# Patient Record
Sex: MALE | Race: ASIAN
[De-identification: names, ages, dates, MRNs, and addresses within clinical notes are randomized per-mention and may not be internally consistent; named-entity substitution may affect disease eponyms.]

---

## 2019-09-12 ENCOUNTER — HOSPITAL (OUTPATIENT)
Dept: OTHER | Age: 29
End: 2019-09-12

## 2019-11-12 ENCOUNTER — HOSPITAL ENCOUNTER (EMERGENCY)
Facility: HOSPITAL | Age: 29
Discharge: HOME | End: 2019-11-12
Attending: EMERGENCY MEDICINE
Payer: COMMERCIAL

## 2019-11-12 ENCOUNTER — APPOINTMENT (OUTPATIENT)
Dept: CT IMAGING | Facility: HOSPITAL | Age: 29
End: 2019-11-12
Attending: EMERGENCY MEDICINE
Payer: COMMERCIAL

## 2019-11-12 VITALS
SYSTOLIC BLOOD PRESSURE: 143 MMHG | HEART RATE: 98 BPM | TEMPERATURE: 98 F | DIASTOLIC BLOOD PRESSURE: 122 MMHG | RESPIRATION RATE: 19 BRPM | OXYGEN SATURATION: 99 %

## 2019-11-12 DIAGNOSIS — S22.22XA CLOSED FRACTURE OF BODY OF STERNUM, INITIAL ENCOUNTER: Primary | ICD-10-CM

## 2019-11-12 PROCEDURE — 71260 CT THORAX DX C+: CPT | Performed by: EMERGENCY MEDICINE

## 2019-11-12 PROCEDURE — 83690 ASSAY OF LIPASE: CPT | Performed by: EMERGENCY MEDICINE

## 2019-11-12 PROCEDURE — 96375 TX/PRO/DX INJ NEW DRUG ADDON: CPT

## 2019-11-12 PROCEDURE — 85025 COMPLETE CBC W/AUTO DIFF WBC: CPT | Performed by: EMERGENCY MEDICINE

## 2019-11-12 PROCEDURE — 74177 CT ABD & PELVIS W/CONTRAST: CPT | Performed by: EMERGENCY MEDICINE

## 2019-11-12 PROCEDURE — 96374 THER/PROPH/DIAG INJ IV PUSH: CPT

## 2019-11-12 PROCEDURE — 70450 CT HEAD/BRAIN W/O DYE: CPT | Performed by: EMERGENCY MEDICINE

## 2019-11-12 PROCEDURE — 93005 ELECTROCARDIOGRAM TRACING: CPT

## 2019-11-12 PROCEDURE — 80076 HEPATIC FUNCTION PANEL: CPT | Performed by: EMERGENCY MEDICINE

## 2019-11-12 PROCEDURE — 99285 EMERGENCY DEPT VISIT HI MDM: CPT

## 2019-11-12 PROCEDURE — 93010 ELECTROCARDIOGRAM REPORT: CPT | Performed by: EMERGENCY MEDICINE

## 2019-11-12 PROCEDURE — 21820 TREAT STERNUM FRACTURE: CPT

## 2019-11-12 PROCEDURE — 81003 URINALYSIS AUTO W/O SCOPE: CPT | Performed by: EMERGENCY MEDICINE

## 2019-11-12 PROCEDURE — 80048 BASIC METABOLIC PNL TOTAL CA: CPT | Performed by: EMERGENCY MEDICINE

## 2019-11-12 PROCEDURE — 80307 DRUG TEST PRSMV CHEM ANLYZR: CPT | Performed by: EMERGENCY MEDICINE

## 2019-11-12 PROCEDURE — 80320 DRUG SCREEN QUANTALCOHOLS: CPT | Performed by: EMERGENCY MEDICINE

## 2019-11-12 RX ORDER — MORPHINE SULFATE 4 MG/ML
4 INJECTION, SOLUTION INTRAMUSCULAR; INTRAVENOUS ONCE
Status: COMPLETED | OUTPATIENT
Start: 2019-11-12 | End: 2019-11-12

## 2019-11-12 RX ORDER — HYDROCODONE BITARTRATE AND ACETAMINOPHEN 5; 325 MG/1; MG/1
1-2 TABLET ORAL EVERY 4 HOURS PRN
Qty: 15 TABLET | Refills: 0 | Status: SHIPPED | OUTPATIENT
Start: 2019-11-12

## 2019-11-12 RX ORDER — KETOROLAC TROMETHAMINE 30 MG/ML
30 INJECTION, SOLUTION INTRAMUSCULAR; INTRAVENOUS ONCE
Status: COMPLETED | OUTPATIENT
Start: 2019-11-12 | End: 2019-11-12

## 2019-11-12 NOTE — ED NOTES
Pt resting on cart. RR even/nonlabored. C/o pain and inability to take deep breaths. O2 97% on room air.

## 2019-11-12 NOTE — ED NOTES
Patient asked multiple times to put phone and earphones into his briefcase on his own. When given his briefcase, patient put his belongings inside and locked the briefcase with a small travel lock.
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
## 2019-11-12 NOTE — ED NOTES
Pt education completed for incentive spirometer. Pt demonstrated proper use of device. All questions answered.
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
## 2025-03-16 NOTE — DISCHARGE INSTRUCTIONS
Thank you for seeking care at [facility] Emergency Department.  You have been seen and evaluated and treated for a peritonsillar abscess.     We discussed the results of your evaluation and work-up in the emergency department   Please read the instructions provided   If given prescriptions, take as instructed   Please keep the area clean, and wash the area gently with mild soap and warm water twice daily.    Not all abscesses require antibiotics, but if antibiotics were prescribed, please complete the course of treatment as directed.    STOP taking the Cefdinir and take the Augmentin instead for 14 days.    Remember, your care process does not end after your visit today. Please follow-up with your doctor within 1-2 days for a follow-up check to ensure your symptoms are improving, to see if you need any further evaluation/testing, or to evaluate for any alternate diagnoses.     If you notice increasing pain, increasing redness around the wound, increasing drainage, feeling lightheaded or dizzy, heavy bleeding, begin having fevers or chills, or feeling very ill you should contact your doctor or return to the emergency department.

## 2025-03-16 NOTE — ED INITIAL ASSESSMENT (HPI)
Pt presents to the ED with c/o worsening left sided throat swelling since 3/13. Pt originally diagnosed with strep and left ear infection on 3/13 and placed of Cefdinir. Pt was seen at  today and diagnosed with a left sided tonsillar abscess. +fevers

## 2025-03-16 NOTE — ED PROVIDER NOTES
[facility] Emergency Department Note  Patient: Bong Membreno Age: 34 year old Sex: male      MRN: [id number]  : 1990    Patient Seen in: [facility] Emergency Department    History     Chief Complaint   Patient presents with    Swelling     Stated Complaint: Abcess, difficulty swallowing    History obtained from: Patient    Patient is a 34-year-old male with past medical history of ADHD presenting today for evaluation of sore throat.  He states that has been having sore throat and left ear pain over the past 5 days.  He states that on 3/13/2025, he was seen in the immediate care facility and diagnosed with strep pharyngitis and a left-sided ear infection and started on cefdinir.  He states that since then, he has been having progressively worsening throat pain and soft tissue swelling now seemingly localized in the left side of his throat.  He states that he was seen again in the immediate care yesterday and given a dose of Decadron with transient improvement of swelling.  States that swelling is worse to the point he is feels short of breath lying flat.  Endorses fevers only at night.  Denies any vomiting, skin rash, or cough.    Review of Systems:  Review of Systems  Positive for stated complaint: Abcess, difficulty swallowing. Constitutional and vital signs reviewed. All other systems reviewed and negative except as noted above.    Patient History:  Past Medical History:    ADHD       No past surgical history on file.     No family history on file.    Specific Social Determinants of Health:   Social History     Socioeconomic History    Marital status: Single           PSFH elements reviewed from today and agreed except as otherwise stated in HPI.    Physical Exam     ED Triage Vitals   BP 25 1335 (!) 155/93   Pulse 25 1335 (!) 131   Resp 25 1335 18   Temp 25 1335 99.2 °F (37.3 °C)   Temp src 25 1335 Oral   SpO2 25 1415 98 %   O2 Device 25 1415 None (Room air)        Current:BP (!) 141/99   Pulse 111   Temp 99.2 °F (37.3 °C) (Oral)   Resp 16   Ht 172.7 cm (5' 8\")   Wt 73 kg   SpO2 99%   BMI 24.48 kg/m²         Physical Exam  Constitutional:       Appearance: He is well-developed.   HENT:      Head: Normocephalic and atraumatic.      Right Ear: External ear normal.      Left Ear: External ear normal.      Nose: Nose normal.      Mouth/Throat:      Comments: Left-sided peritonsillar fullness with left-sided exudates, rightward deviation of the uvula, no right-sided swelling  Eyes:      Conjunctiva/sclera: Conjunctivae normal.      Pupils: Pupils are equal, round, and reactive to light.   Cardiovascular:      Rate and Rhythm: Normal rate and regular rhythm.      Heart sounds: Normal heart sounds.   Pulmonary:      Effort: Pulmonary effort is normal.      Breath sounds: Normal breath sounds.   Abdominal:      General: Bowel sounds are normal.      Palpations: Abdomen is soft.      Tenderness: There is no abdominal tenderness.   Musculoskeletal:         General: Normal range of motion.      Cervical back: Normal range of motion and neck supple.   Skin:     General: Skin is warm and dry.      Findings: No rash.   Neurological:      General: No focal deficit present.      Mental Status: He is alert and oriented to person, place, and time.      Deep Tendon Reflexes: Reflexes are normal and symmetric.   Psychiatric:         Mood and Affect: Mood normal.         Behavior: Behavior normal.         ED Course   Labs:   Labs Reviewed   BASIC METABOLIC PANEL (8) - Abnormal; Notable for the following components:       Result Value    Potassium 3.4 (*)     BUN 8 (*)     All other components within normal limits   CBC WITH DIFFERENTIAL WITH PLATELET - Abnormal; Notable for the following components:    Neutrophil Absolute Prelim 8.86 (*)     Neutrophil Absolute 8.86 (*)     All other components within normal limits     Radiology findings:  I personally reviewed the images.   CT SOFT  TISSUE OF NECK(CONTRAST ONLY) (CPT=70491)    Result Date: 3/16/2025  CONCLUSION:  1. Massive enlargement of left palatine tonsil with a 2.9 cm tonsillar abscess.  ENT evaluation recommended.    Dictated by (CST): Duc Pennington MD on 3/16/2025 at 3:47 PM     Finalized by (CST): Duc Pennington MD on 3/16/2025 at 3:53 PM             Cardiac Monitor: Interpreted by me.   Pulse Readings from Last 1 Encounters:   03/16/25 111   , sinus,     External non-ED records reviewed independently by me: PCP note from 9/18/2024 reviewed confirming patient has past medical history of chronic neck pain, facial pain with TMJ dislocation and PTSD.    MDM   34-year-old male with past medical history of ADHD presenting for evaluation of sore throat gradually worsening over the past 4 days despite being on cefdinir initiated by the immediate care facility.  Upon arrival to the emergency department, he is tachycardic but otherwise afebrile.  Has left-sided peritonsillar fullness and rightward deviation of the uvula with left-sided exudates and erythema.    Differential diagnoses considered includes, but is not limited to: Peritonsillar abscess, phlegmon, retropharyngeal abscess, strep pharyngitis    Will obtain the following tests: CBC, BMP, CT soft tissue neck  Please see ED course for my independent review of these tests/imaging results.    Initial Medications/Therapeutics administered: Toradol, Decadron, NS bolus, IV Unasyn,    Chronic conditions affecting care: ADHD, PTSD, chronic neck pain    Workup and medications considered but not ordered: Considered admission    Social Determinants of Health that impacted care: None     ED course: Laboratory workup with no significant leukocytosis.  I independently reviewed the CT soft tissue neck that shows evidence of peritonsillar abscess.  Agree with radiology reads above.  I performed a bedside needle aspiration of the peritonsillar abscess and drained 4 cc of purulent drainage.  Patient  tolerated this well.  Noted significant for admitted pain.  Stable for discharge home at this time on course of Augmentin with close ENT follow-up.  Discussed Tylenol and ibuprofen as needed for pain.  Return precautions provided and all questions answered.  Patient expressed understanding and agreement with plan.        Procedures:  Procedure: Peritonsilar abscess drainage.  Prior to procedure, documentation was reviewed, informed consent was obtained, appropriate equipment was present, and a time out was performed to identify the correct patient, procedure and site .     Topical lidocaine applied followed by injection of 2 cc of lido 1% w/ epi injected then aspirated 4 cc of thick purulent drainage.  Patient tolerated well min blood loss.          Disposition and Plan     Clinical Impression:  1. Peritonsillar abscess        Disposition:  Discharge    Follow-up:  Finesse Beltran MD  [facility] [address]  [phone]    Schedule an appointment as soon as possible for a visit in 2 day(s)  As needed, If symptoms worsen      Medications Prescribed:  Discharge Medication List as of 3/16/2025  5:24 PM        START taking these medications    Details   amoxicillin clavulanate 875-125 MG Oral Tab Take 1 tablet by mouth 2 (two) times daily for 14 days., Normal, Disp-28 tablet, R-0               This note may have been created using voice dictation technology and may include inadvertent errors.      Teresa Arevalo MD  Emergency Medicine

## 2025-03-20 NOTE — CM/SW NOTE
Per LAZARO, called [facility] ENT at [phone] and spoke with Grecia to schedule an expedited appointment for patient.    Appointment scheduled for:    Friday, March 21, 2025 at 230pm  Dr. Anshul Morris  [address] [place] [address]    EDCM spoke with patient and he v/u on above information.    MD and Rn updated.

## 2025-03-20 NOTE — ED INITIAL ASSESSMENT (HPI)
Pt states that he was seen here 03/16/2025 Diagnosed with Peritonsillar Abscess currently taking antibiotics.  Per pt morning of March 19,2025 pain on swallowing,troubled swallowing started.  Denies fever.  Took Ibuprofen 600 mgs this morning.  Pt is A/Ox 4, breathing unlabored.

## 2025-03-20 NOTE — ED PROVIDER NOTES
Patient Seen in: [facility] Emergency Department      History     Chief Complaint   Patient presents with    Swallowing Problem     Stated Complaint: trouble with swallowing,peritonsillar abscess    Subjective:   HPI      34-year-old seen a few days ago with a diagnosed left peritonsillar abscess status post aspiration on Augmentin given 1 dose of Decadron that they now here with some worsening pain without ibuprofen helping at home.  No fever.  Still having swallowing pain.  Has not seen ENT.  He reports good compliance with his medications specifically his antibiotics.    Objective:     Past Medical History:    ADHD              History reviewed. No pertinent surgical history.             No pertinent social history.                Physical Exam     ED Triage Vitals [03/20/25 1417]   BP (!) 139/99   Pulse 113   Resp 20   Temp 98.4 °F (36.9 °C)   Temp src Oral   SpO2 98 %   O2 Device None (Room air)       Current Vitals:   Vital Signs  BP: (!) 145/111  Pulse: 100  Resp: 20  Temp: 98.4 °F (36.9 °C)  Temp src: Oral  MAP (mmHg): (!) 122    Oxygen Therapy  SpO2: 99 %  O2 Device: None (Room air)        Physical Exam  Constitutional: Oriented to person, place, and time.  Appears well-developed. No distress.   Head: Normocephalic and atraumatic.   Eyes: Conjunctivae are normal. Pupils are equal, round, and reactive to light.    ENT: Fullness across the posterior pharyngeal regions bilaterally worse on the left than the right.  No obvious protruding abscess.  Uvula is slightly edematous.  Can visualize the epiglottis which appears normal.  He is controlling his secretions.  There is no trismus.  Neck: Normal range of motion. Neck supple.  Mild anterior lymphadenopathy bilaterally.  No stridor or fullness abnormality.  Cardiovascular: Normal rate, regular rhythm and intact distal pulses.    Pulmonary/Chest: Effort normal. No respiratory distress.   Musculoskeletal: Normal range of motion. No edema or tenderness.    Neurological: Alert and oriented to person, place, and time.   Skin: Skin is warm and dry.     Nursing note and vitals reviewed.    Differential diagnosis includes resolving peritonsillar abscess and tonsillar cellulitis.      ED Course   Labs Reviewed - No data to display                MDM              Medical Decision Making  Patient is very anxious but looks great.  I talked to the , she will try to get him into see ENT tomorrow if possible.  Will start him on some steroids and a taper now.  He will continue the Augmentin.  I will give him something else for pain since ibuprofen is not helping.  Encourage fluids.  He can do OTC Tylenol.  He will follow-up as planned and come back with any worsening or change.  No indication for admission to the hospital at this time.    Problems Addressed:  Peritonsillar abscess: acute illness or injury with systemic symptoms    Amount and/or Complexity of Data Reviewed  External Data Reviewed: radiology and notes.     Details: CT and notes reviewed from outpatient visit 3/16/2025 and patient was documented to have a left-sided peritonsillar abscess now status post aspiration at the bedside.  He is on Augmentin which is appropriate.  He got 1 dose of Decadron that day.    Risk  OTC drugs.  Prescription drug management.  Decision regarding hospitalization.        Disposition and Plan     Clinical Impression:  1. Peritonsillar abscess         Disposition:  Discharge  3/20/2025  4:10 pm    Follow-up:  Anshul Morris DO  [address] [address]  [phone]    Follow up in 1 day(s)            Medications Prescribed:  Current Discharge Medication List        START taking these medications    Details   predniSONE 20 MG Oral Tab Take 3 tablets (60 mg total) by mouth daily for 1 day, THEN 2 tablets (40 mg total) daily for 2 days, THEN 1 tablet (20 mg total) daily for 2 days.  Qty: 9 tablet, Refills: 0    Associated Diagnoses: Peritonsillar  abscess      !! HYDROcodone-acetaminophen 5-325 MG Oral Tab Take 1 tablet by mouth every 6 (six) hours as needed.  Qty: 10 tablet, Refills: 0    Associated Diagnoses: Peritonsillar abscess       !! - Potential duplicate medications found. Please discuss with provider.              Supplementary Documentation:

## 2025-03-20 NOTE — ED QUICK NOTES
Pt verbalizes understanding of discharge paperwork including medications, follow-up care, and education. Pt a/o x4, VSS, NAD, ambulates with steady gait. Pt verbalizes understanding of attending appointment with ENT tomorrow @ 0184.

## 2025-03-31 NOTE — ED INITIAL ASSESSMENT (HPI)
Pt to ed sent from ENT office. Pt states \"I have been seeing a ear nose and throat doctor and I was being treated for a throat infection with amoxicillin, the infection was going away, and the ENT told me the infection would go away after 14 days of treatment but it has not gotten better I actually think it spread down to the base of my tongue\" pt states he has a follow up appointment on 4/2 with ENT and is here requesting amoxicillin prescription until he see's ENT. Pt reports difficulty swallowing but not breathing.

## 2025-03-31 NOTE — PROGRESS NOTES
Subjective:   Patient ID: Bong Membreno is a 34 year old male.    Patient presents for throat pain and swelling feeling to his throat. Reports difficulty swallowing. Reports 4/10 pain at this time. Reports he has noticed 50% improvement but he is still feeling very sore.     3/16 ED visit LPTA drained and started on 14 day course of Augmentin.   3/20 ED visit Prednisone and Hydrocodone  3/21 ENT visit. Patient was improving and advised to continue Augmentin and F/U with ENT if symptoms return.   3/25 Patient reports visit at [facility]. He was prescribed additional taper course of Prednisone which he reports has not helped with symptoms.     He presents today for refill on Augmentin until he can get in with ENT.           History/Other:   Review of Systems   Constitutional:  Negative for chills and fever.   HENT:  Positive for sore throat and trouble swallowing. Negative for dental problem, drooling, ear pain and voice change.    Respiratory:  Negative for cough, chest tightness, shortness of breath, wheezing and stridor.    Cardiovascular:  Negative for chest pain.   Gastrointestinal:  Negative for nausea and vomiting.   Skin: Negative.    Psychiatric/Behavioral:  The patient is nervous/anxious.      Current Outpatient Medications   Medication Sig Dispense Refill    predniSONE 20 MG Oral Tab       ALPRAZolam ER 2 MG Oral Tablet 24 Hr Take 1 tablet (2 mg total) by mouth daily.      Omeprazole 40 MG Oral Capsule Delayed Release       Acetaminophen Extra Strength 500 MG Oral Tab Take 2 tablets (1,000 mg total) by mouth every 8 (eight) hours. (Patient not taking: Reported on 3/31/2025)      albuterol 108 (90 Base) MCG/ACT Inhalation Aero Soln Inhale 2 puffs into the lungs. (Patient not taking: Reported on 3/31/2025)      amphetamine-dextroamphetamine 20 MG Oral Tab Take 1 tablet (20 mg total) by mouth daily. (Patient not taking: Reported on 3/31/2025)      amphetamine-dextroamphetamine 30 MG Oral Tab Take 1  tablet (30 mg total) by mouth every morning. (Patient not taking: Reported on 3/31/2025)      amphetamine-dextroamphetamine ER 30 MG Oral Capsule SR 24 Hr TAKE 1 CAPSULE BY MOUTH (30 MG TOTAL) ONE TIME EACH DAY IN THE MORNING DO NOT CRUSH OR CHEW. (Patient not taking: Reported on 3/31/2025)      baclofen 10 MG Oral Tab Take 1 tablet (10 mg total) by mouth nightly. (Patient not taking: Reported on 3/31/2025)      busPIRone 15 MG Oral Tab TAKE 1 TABLET BY MOUTH IN THE MORNING AND 1 TABLET AT NOON AND 1 TABLET BEFORE BEDTIME (Patient not taking: Reported on 3/31/2025)      cefdinir 300 MG Oral Cap Take 1 capsule (300 mg total) by mouth 2 (two) times daily. (Patient not taking: Reported on 3/31/2025)      diclofenac 1 % External Gel Apply 1 Application topically daily. (Patient not taking: Reported on 3/31/2025)      FLUoxetine 20 MG Oral Cap Take 1 capsule (20 mg total) by mouth daily. (Patient not taking: Reported on 3/31/2025)      FLUoxetine HCl 40 MG Oral Cap Take 1 capsule (40 mg total) by mouth daily. (Patient not taking: Reported on 3/31/2025)      gabapentin 300 MG Oral Cap  (Patient not taking: Reported on 3/31/2025)      hydrOXYzine 25 MG Oral Tab Take 1 tablet (25 mg total) by mouth. (Patient not taking: Reported on 3/31/2025)      lidocaine 5 % External Ointment APPLY TOPICALLY TO THE AFFECTED AREA 2 TIMES A DAY IF NEEDED FOR MILD OR MODERATE PAIN (Patient not taking: Reported on 3/31/2025)      Lidocaine Viscous HCl 2 % Mouth/Throat Solution  (Patient not taking: Reported on 3/31/2025)      naproxen 500 MG Oral Tab Take 1 tablet (500 mg total) by mouth 2 (two) times daily with meals. (Patient not taking: Reported on 3/31/2025)      prazosin 2 MG Oral Cap  (Patient not taking: Reported on 3/31/2025)      prazosin 5 MG Oral Cap TAKE 1 CAPSULE BY MOUTH IN THE MORNIGN AND 1 CAPSULE BEFORE BEDTIME (Patient not taking: Reported on 3/31/2025)      pregabalin 75 MG Oral Cap  (Patient not taking: Reported on  3/31/2025)      QUEtiapine 300 MG Oral Tab Take 1 tablet (300 mg total) by mouth nightly. (Patient not taking: Reported on 3/31/2025)      QUEtiapine 100 MG Oral Tab TAKE 1.5 TABLET BY MOUTH (150MG TOTAL) EVERY NIGHT (Patient not taking: Reported on 3/31/2025)      QUEtiapine  MG Oral Tablet 24 Hr Take 1 tablet (300 mg total) by mouth nightly. (Patient not taking: Reported on 3/31/2025)      Urea 20 % External Cream Apply topically 2 (two) times daily. (Patient not taking: Reported on 3/31/2025)      HYDROcodone-acetaminophen 5-325 MG Oral Tab Take 1 tablet by mouth every 6 (six) hours as needed. (Patient not taking: Reported on 3/31/2025) 10 tablet 0    HYDROcodone-acetaminophen 5-325 MG Oral Tab Take 1-2 tablets by mouth every 4 (four) hours as needed for Pain. (Patient not taking: Reported on 3/31/2025) 15 tablet 0     Allergies:Allergies[1]    Objective:   Physical Exam  Constitutional:       General: He is not in acute distress.     Appearance: Normal appearance. He is not ill-appearing.   HENT:      Head:      Jaw: No trismus.      Right Ear: Tympanic membrane normal.      Left Ear: Tympanic membrane normal.      Nose: Nose normal.      Mouth/Throat:      Mouth: Mucous membranes are moist.      Pharynx: Uvula midline. Posterior oropharyngeal erythema present. No uvula swelling.      Tonsils: No tonsillar exudate or tonsillar abscesses.      Comments: Uvula with bruising. Epiglottis visible. Appears uncomfortable when swallowing.   Eyes:      Extraocular Movements: Extraocular movements intact.      Conjunctiva/sclera: Conjunctivae normal.      Pupils: Pupils are equal, round, and reactive to light.   Neck:      Thyroid: No thyroid mass, thyromegaly or thyroid tenderness.   Cardiovascular:      Rate and Rhythm: Normal rate and regular rhythm.   Pulmonary:      Effort: Pulmonary effort is normal.      Breath sounds: Normal breath sounds.   Musculoskeletal:      Cervical back: Full passive range of motion  without pain, normal range of motion and neck supple.   Lymphadenopathy:      Cervical: No cervical adenopathy.   Skin:     General: Skin is warm and dry.   Neurological:      Mental Status: He is alert.   Psychiatric:         Mood and Affect: Mood is anxious.         Behavior: Behavior is cooperative.         /86 (BP Location: Left arm, Patient Position: Sitting, Cuff Size: adult)   Pulse 118   Temp 98.1 °F (36.7 °C) (Oral)   Resp 19   Ht 5' 5.35\" (1.66 m)   Wt 154 lb 12.8 oz (70.2 kg)   SpO2 97%   BMI 25.48 kg/m²     Accompanied by: self  After triage, a higher acuity of care was recommended to pt.  Discussed limitations of WIC and need for further evaluation  Referred to: [facility] ED  Patient verbalized understanding of rationale for further evaluation and was stable upon discharge.             [1] No Known Allergies

## 2025-04-01 NOTE — ED PROVIDER NOTES
Patient Seen in: [facility] Emergency Department    History     Chief Complaint   Patient presents with    Cough/URI       HPI    Patient presents to the ED complaining of ongoing symptoms to his throat.  He recently was seen several weeks ago for a peritonsillar abscess which was drained.  He was on a course of antibiotics and states symptoms improved in the past few days after antibiotics for now he states he felt slightly worse again.    History reviewed.   Past Medical History:    ADHD       History reviewed.   Past Surgical History:   Procedure Laterality Date    Appendectomy           Medications :  Prescriptions Prior to Admission[1]     No family history on file.    Smoking Status:   Social History     Socioeconomic History    Marital status: Single   Tobacco Use    Smoking status: Never    Smokeless tobacco: Never   Vaping Use    Vaping status: Never Used   Substance and Sexual Activity    Alcohol use: Yes    Drug use: Never       Constitutional and vital signs reviewed.      Social History and Family History elements reviewed from today, pertinent positives to the presenting problem noted.    Physical Exam     ED Triage Vitals [03/31/25 1735]   BP (!) 148/100   Pulse 107   Resp 21   Temp 98.3 °F (36.8 °C)   Temp src    SpO2 99 %   O2 Device None (Room air)       All measures to prevent infection transmission during my interaction with the patient were taken. Handwashing was performed prior to and after the exam.  Stethoscope and any equipment used during my examination was cleaned with super sani-cloth germicidal wipes following the exam.     Physical Exam  Vitals and nursing note reviewed.   Constitutional:       General: He is not in acute distress.     Appearance: He is well-developed. He is not ill-appearing or toxic-appearing.   HENT:      Head: Normocephalic and atraumatic.      Nose: Nose normal.      Mouth/Throat:      Mouth: Mucous membranes are moist.      Pharynx: Oropharynx is clear.  No oropharyngeal exudate or posterior oropharyngeal erythema.      Comments: Unremarkable posterior pharynx.  No swelling, no erythema noted, no exudates.  Able to see the patient's epiglottis which appears normal.  No swelling to the posterior pharynx.  Normal voice, no trismus  Eyes:      General:         Right eye: No discharge.         Left eye: No discharge.      Conjunctiva/sclera: Conjunctivae normal.   Neck:      Trachea: No tracheal deviation.   Pulmonary:      Effort: Pulmonary effort is normal. No respiratory distress.      Breath sounds: No stridor.   Musculoskeletal:         General: No deformity.   Skin:     General: Skin is warm and dry.   Neurological:      Mental Status: He is alert and oriented to person, place, and time.   Psychiatric:         Behavior: Behavior normal.      Comments: anxious mood         ED Course      Labs Reviewed - No data to display    As Interpreted by me    Imaging Results Available and Reviewed while in ED: No results found.  ED Medications Administered:   Medications   dexamethasone (Decadron) tab 4 mg (4 mg Oral Given 3/31/25 1949)         MDM     Vitals:    03/31/25 1735   BP: (!) 148/100   Pulse: 107   Resp: 21   Temp: 98.3 °F (36.8 °C)   SpO2: 99%     *I personally reviewed and interpreted all ED vitals.    Pulse Ox: 99%, Room air, Normal       Differential Diagnosis/ Diagnostic Considerations: Pharyngitis, abscess, other    Complicating Factors: The patient already has does not have a problem list on file. to contribute to the complexity of this ED evaluation.    Medical Decision Making  Patient reports ongoing symptoms although much improved, recent peritonsillar abscess status post needle aspiration.  Patient finished a course of antibiotics.  He states since antibiotics finished somewhat worse.  Nondistressed on examination.  Low concern clinically for peritonsillar or retropharyngeal abscess.  Patient advised on ENT follow-up which he has scheduled in 2 days and  will prescribe a repeat course of antibiotics at this time.    Problems Addressed:  Pharyngitis, unspecified etiology: acute illness or injury    Risk  Prescription drug management.        Condition upon leaving the department: Stable    Disposition and Plan     Clinical Impression:  1. Pharyngitis, unspecified etiology        Disposition:  Discharge    Follow-up:  Anshul Morris DO  [address] [address]  [phone]    Go in 2 day(s)        Medications Prescribed:  Discharge Medication List as of 3/31/2025  7:52 PM                           [1] (Not in a hospital admission)

## 2025-04-02 NOTE — PROGRESS NOTES
[place]  OTOLARYNGOLOGY - HEAD & NECK SURGERY    4/2/2025     Reason for Consultation:   Peritonsillar abscess, sore throat    History of Present Illness:   Patient is a pleasant 34 year old male who is being seen for a peritonsillar abscess which he had developed in the past few weeks.  The patient states initially started with sore throat and was treated with cefdinir which did not get rid of his issue.  He then continued to develop sore throat and eventually developed significant trismus and swelling of his tonsil and was sent from the immediate care to the ER where he was evaluated with CT neck.  He was then drained in the ER and sent home on Augmentin.  The patient then returned a few days later as he was anxious about the swelling in his throat.  On that exam he did appear much improved and he was given prednisone as he was having pain still.  Today he comes 1 day after his second ER visit and states that he is feeling better but still has some residual inflammation and pain especially on the left side behind his uvula.  Has been slowly going back to normal diet but still eating very soft foods.  This has not happened to him previously.  Has not had recurrent strep infections.    INTERVAL HISTORY  4/2/2025: The patient returns today because he was in the ER 2 days ago for the sensation of pain in the area behind his tongue.  He states that his epiglottis felt swollen.  He was then given Decadron and antibiotics in the ER and sent home with prednisone and a refill of his Augmentin.  The patient has been on Augmentin continuously over the past few weeks now.  He states now the right side of his throat feels inflamed and he has some odynophagia.  Otherwise does not have any changes in his voice, trismus, difficulty eating.    Past Medical History  Past Medical History:    ADHD       Past Surgical History  Past Surgical History:   Procedure Laterality Date    Appendectomy         Family History  No family history  on file.    Social History  Pediatric History   Patient Parents    chuck crystal (Father)     Other Topics Concern    Not on file   Social History Narrative    Not on file           Current Medications:  Current Outpatient Medications   Medication Sig Dispense Refill    predniSONE 20 MG Oral Tab  (Patient not taking: Reported on 4/2/2025)      amoxicillin clavulanate 875-125 MG Oral Tab Take 1 tablet by mouth 2 (two) times daily for 10 days. (Patient not taking: Reported on 4/2/2025) 20 tablet 0    Acetaminophen Extra Strength 500 MG Oral Tab Take 2 tablets (1,000 mg total) by mouth every 8 (eight) hours. (Patient not taking: Reported on 4/2/2025)      albuterol 108 (90 Base) MCG/ACT Inhalation Aero Soln Inhale 2 puffs into the lungs. (Patient not taking: Reported on 4/2/2025)      amphetamine-dextroamphetamine 20 MG Oral Tab Take 1 tablet (20 mg total) by mouth daily. (Patient not taking: Reported on 3/21/2025)      ALPRAZolam ER 2 MG Oral Tablet 24 Hr Take 1 tablet (2 mg total) by mouth daily. (Patient not taking: Reported on 4/2/2025)      amphetamine-dextroamphetamine 30 MG Oral Tab Take 1 tablet (30 mg total) by mouth every morning. (Patient not taking: Reported on 3/21/2025)      amphetamine-dextroamphetamine ER 30 MG Oral Capsule SR 24 Hr TAKE 1 CAPSULE BY MOUTH (30 MG TOTAL) ONE TIME EACH DAY IN THE MORNING DO NOT CRUSH OR CHEW. (Patient not taking: Reported on 3/21/2025)      baclofen 10 MG Oral Tab Take 1 tablet (10 mg total) by mouth nightly. (Patient not taking: Reported on 3/21/2025)      busPIRone 15 MG Oral Tab TAKE 1 TABLET BY MOUTH IN THE MORNING AND 1 TABLET AT NOON AND 1 TABLET BEFORE BEDTIME (Patient not taking: Reported on 3/21/2025)      cefdinir 300 MG Oral Cap Take 1 capsule (300 mg total) by mouth 2 (two) times daily. (Patient not taking: Reported on 3/21/2025)      diclofenac 1 % External Gel Apply 1 Application topically daily. (Patient not taking: Reported on 3/21/2025)      FLUoxetine 20  MG Oral Cap Take 1 capsule (20 mg total) by mouth daily. (Patient not taking: Reported on 3/21/2025)      FLUoxetine HCl 40 MG Oral Cap Take 1 capsule (40 mg total) by mouth daily. (Patient not taking: Reported on 3/21/2025)      gabapentin 300 MG Oral Cap  (Patient not taking: Reported on 3/21/2025)      hydrOXYzine 25 MG Oral Tab Take 1 tablet (25 mg total) by mouth. (Patient not taking: Reported on 4/2/2025)      lidocaine 5 % External Ointment APPLY TOPICALLY TO THE AFFECTED AREA 2 TIMES A DAY IF NEEDED FOR MILD OR MODERATE PAIN (Patient not taking: Reported on 3/21/2025)      Lidocaine Viscous HCl 2 % Mouth/Throat Solution  (Patient not taking: Reported on 3/21/2025)      naproxen 500 MG Oral Tab Take 1 tablet (500 mg total) by mouth 2 (two) times daily with meals. (Patient not taking: Reported on 3/21/2025)      Omeprazole 40 MG Oral Capsule Delayed Release  (Patient not taking: Reported on 4/2/2025)      prazosin 2 MG Oral Cap  (Patient not taking: Reported on 3/21/2025)      prazosin 5 MG Oral Cap TAKE 1 CAPSULE BY MOUTH IN THE MORNIGN AND 1 CAPSULE BEFORE BEDTIME (Patient not taking: Reported on 3/21/2025)      pregabalin 75 MG Oral Cap  (Patient not taking: Reported on 3/21/2025)      QUEtiapine 300 MG Oral Tab Take 1 tablet (300 mg total) by mouth nightly. (Patient not taking: Reported on 3/21/2025)      QUEtiapine 100 MG Oral Tab TAKE 1.5 TABLET BY MOUTH (150MG TOTAL) EVERY NIGHT (Patient not taking: Reported on 3/21/2025)      QUEtiapine  MG Oral Tablet 24 Hr Take 1 tablet (300 mg total) by mouth nightly. (Patient not taking: Reported on 3/21/2025)      Urea 20 % External Cream Apply topically 2 (two) times daily. (Patient not taking: Reported on 3/21/2025)      HYDROcodone-acetaminophen 5-325 MG Oral Tab Take 1 tablet by mouth every 6 (six) hours as needed. (Patient not taking: Reported on 4/2/2025) 10 tablet 0    HYDROcodone-acetaminophen 5-325 MG Oral Tab Take 1-2 tablets by mouth every 4  (four) hours as needed for Pain. (Patient not taking: Reported on 4/2/2025) 15 tablet 0       Allergies  Allergies[1]    Review of Systems:   A comprehensive 10 point review of systems was completed.  Pertinent positives and negatives noted in the the HPI.    Physical Exam:   There were no vitals taken for this visit.    GENERAL: No acute distress, Comfortable appearing  FACE: HB 1/6, Normal Animation  HEAD: Normocephalic  EYES: EOMI, pupils equil  EARS: Bilateral Auricles Symmetric, bilateral tympanic membranes appear normal  NOSE: Nares patent bilaterally  ORAL CAVITY: Tongue mobile, Oropharynx with 1+ tonsils, minimal erythema and edema of the posterior pillars bilaterally, floor of mouth clear, Posterior oropharynx normal, able to visualize the epiglottis which appears normal   NECK: No palpable lymphadenopathy, thyroid not palpable, nontender      Results:     Laboratory Data:  Lab Results   Component Value Date    WBC 11.0 03/16/2025    HGB 15.5 03/16/2025    HCT 46.0 03/16/2025    .0 03/16/2025    CREATSERUM 0.73 03/16/2025    BUN 8 (L) 03/16/2025     03/16/2025    K 3.4 (L) 03/16/2025     03/16/2025    CO2 27.0 03/16/2025    GLU 91 03/16/2025    CA 9.3 03/16/2025    ALB 4.2 11/12/2019    ALKPHO 57 11/12/2019    TP 7.3 11/12/2019    AST 17 11/12/2019    ALT 27 11/12/2019     11/12/2019    ETOH <3 11/12/2019         Imaging:  CT SOFT TISSUE OF NECK(CONTRAST ONLY) (CPT=70491)    Result Date: 3/16/2025  PROCEDURE: CT SOFT TISSUE OF NECK (CONTRAST ONLY) (CPT=70491)  COMPARISON: None.  INDICATIONS: Left-sided throat pain and abscess, difficulty swallowing.  Odynophagia  TECHNIQUE:   CT images were obtained with non-ionic intravenous contrast material.  Automated exposure control for dose reduction was used. Adjustment of the mA and/or kV was done based on the patient's size. Use of iterative reconstruction technique for  dose reduction was used. Dose information is transmitted to the ACR  (American College of Radiology) NRDR (National Radiology Data Registry) which includes the Dose Index Registry.   FINDINGS:  UPPER AERODIGESTIVE TRACT: Elevation of soft palate closing the nasopharynx.  No nasopharyngeal mass.  Asymmetric mass like enlargement of the left palatine tonsil with a 2.9 x 2.5 x 1.7 cm region of hypoattenuation likely related to a tonsillar abscess.   No oral cavity mass or calculus.  Epiglottis, supraglottic larynx, larynx and subglottic airway are normal.  No suspect mass in the upper aerodigestive tract. PAROTID GLANDS:   Normal.  SUBMANDIBULAR GLANDS:   Normal.  THYROID GLAND:   Normal.  LYMPH NODES:   Approximately 1 cm short axis borderline enlarged bilateral level 2 a lymph nodes-likely reactive. SINONASAL:   No acute sinusitis.  A right maxillary sinus retention cyst.  Nasal septum deviates towards left.  No nasal passage mass. MASTOIDS: No evidence for mastoiditis. ORBITS: Limited views normal. BRAIN: No suspect mass or enhancement in visualized portion of brain. VASCULAR: Normal.  BONES: Old right-sided nasal bone fracture. APICES/MEDIASTINUM:   Normal visualized portions.  OTHER: Negative.         CONCLUSION:  1. Massive enlargement of left palatine tonsil with a 2.9 cm tonsillar abscess.  ENT evaluation recommended.    Dictated by (CST): Duc Pennington MD on 3/16/2025 at 3:47 PM     Finalized by (CST): Duc Pennington MD on 3/16/2025 at 3:53 PM              Impression:       ICD-10-CM    1. Peritonsillar abscess  J36       2. Odynophagia  R13.10            Recommendations:  The patient has been continuously on Augmentin and prednisone.  I would like him to continue the antibiotics just for another 5 days and then stop.  I do not think that this is bacterial in origin.  He will return to see me in 2 weeks we will follow-up on his symptoms.    Thank you for allowing me to participate in the care of your patient.    Anshul Morris, DO   Otolaryngology/Rhinology, Sinus, and  Endoscopic Skull Base Surgery  [address] [place] [id number]  Phone [phone]  Fax [phone]  3/21/2025  9:55 AM  4/2/2025          [1] No Known Allergies

## 2025-04-26 NOTE — PROGRESS NOTES
[place]  OTOLARYNGOLOGY - HEAD & NECK SURGERY    4/25/2025     Reason for Consultation:   Peritonsillar abscess, sore throat    History of Present Illness:   Patient is a pleasant 34 year old male who is being seen for a peritonsillar abscess which he had developed in the past few weeks.  The patient states initially started with sore throat and was treated with cefdinir which did not get rid of his issue.  He then continued to develop sore throat and eventually developed significant trismus and swelling of his tonsil and was sent from the immediate care to the ER where he was evaluated with CT neck.  He was then drained in the ER and sent home on Augmentin.  The patient then returned a few days later as he was anxious about the swelling in his throat.  On that exam he did appear much improved and he was given prednisone as he was having pain still.  Today he comes 1 day after his second ER visit and states that he is feeling better but still has some residual inflammation and pain especially on the left side behind his uvula.  Has been slowly going back to normal diet but still eating very soft foods.  This has not happened to him previously.  Has not had recurrent strep infections.    INTERVAL HISTORY  4/2/2025: The patient returns today because he was in the ER 2 days ago for the sensation of pain in the area behind his tongue.  He states that his epiglottis felt swollen.  He was then given Decadron and antibiotics in the ER and sent home with prednisone and a refill of his Augmentin.  The patient has been on Augmentin continuously over the past few weeks now.  He states now the right side of his throat feels inflamed and he has some odynophagia.  Otherwise does not have any changes in his voice, trismus, difficulty eating.  4/25/2025: The patient returns today for follow-up.  States that he does feel significantly improved.  Still has some mild edema of his uvula.  Otherwise has been able to eat and drink  without an issue.  Today he also endorses that he had some significant nasal congestion on the left side.  Has a history of rhinoplasty which she had more than 10 years ago.  States he also had some trauma to the left side of his nose and is concerned that his nasal bones may have shifted.    Past Medical History  Past Medical History:    ADHD       Past Surgical History  Past Surgical History:   Procedure Laterality Date    Appendectomy         Family History  No family history on file.    Social History  Pediatric History   Patient Parents    chuck crystal (Father)     Other Topics Concern    Not on file   Social History Narrative    Not on file           Current Medications:  Current Outpatient Medications   Medication Sig Dispense Refill    fluticasone propionate 50 MCG/ACT Nasal Suspension 1 spray by Nasal route 2 (two) times daily. 16 g 3    azelastine 0.1 % Nasal Solution 1 spray by Nasal route 2 (two) times daily. 30 mL 3    predniSONE 20 MG Oral Tab  (Patient not taking: Reported on 4/25/2025)      Acetaminophen Extra Strength 500 MG Oral Tab Take 2 tablets (1,000 mg total) by mouth every 8 (eight) hours. (Patient not taking: Reported on 4/25/2025)      albuterol 108 (90 Base) MCG/ACT Inhalation Aero Soln Inhale 2 puffs into the lungs. (Patient not taking: Reported on 4/25/2025)      amphetamine-dextroamphetamine 20 MG Oral Tab Take 1 tablet (20 mg total) by mouth daily. (Patient not taking: Reported on 4/25/2025)      ALPRAZolam ER 2 MG Oral Tablet 24 Hr Take 1 tablet (2 mg total) by mouth daily. (Patient not taking: Reported on 4/25/2025)      amphetamine-dextroamphetamine 30 MG Oral Tab Take 1 tablet (30 mg total) by mouth every morning. (Patient not taking: Reported on 4/25/2025)      amphetamine-dextroamphetamine ER 30 MG Oral Capsule SR 24 Hr TAKE 1 CAPSULE BY MOUTH (30 MG TOTAL) ONE TIME EACH DAY IN THE MORNING DO NOT CRUSH OR CHEW. (Patient not taking: Reported on 4/25/2025)      baclofen 10 MG Oral  Tab Take 1 tablet (10 mg total) by mouth nightly. (Patient not taking: Reported on 4/25/2025)      busPIRone 15 MG Oral Tab TAKE 1 TABLET BY MOUTH IN THE MORNING AND 1 TABLET AT NOON AND 1 TABLET BEFORE BEDTIME (Patient not taking: Reported on 4/25/2025)      cefdinir 300 MG Oral Cap Take 1 capsule (300 mg total) by mouth 2 (two) times daily. (Patient not taking: Reported on 4/25/2025)      diclofenac 1 % External Gel Apply 1 Application topically daily. (Patient not taking: Reported on 4/25/2025)      FLUoxetine 20 MG Oral Cap Take 1 capsule (20 mg total) by mouth daily. (Patient not taking: Reported on 4/25/2025)      FLUoxetine HCl 40 MG Oral Cap Take 1 capsule (40 mg total) by mouth daily. (Patient not taking: Reported on 4/25/2025)      gabapentin 300 MG Oral Cap  (Patient not taking: Reported on 4/25/2025)      hydrOXYzine 25 MG Oral Tab Take 1 tablet (25 mg total) by mouth. (Patient not taking: Reported on 4/25/2025)      lidocaine 5 % External Ointment APPLY TOPICALLY TO THE AFFECTED AREA 2 TIMES A DAY IF NEEDED FOR MILD OR MODERATE PAIN (Patient not taking: Reported on 4/25/2025)      Lidocaine Viscous HCl 2 % Mouth/Throat Solution  (Patient not taking: Reported on 4/25/2025)      naproxen 500 MG Oral Tab Take 1 tablet (500 mg total) by mouth 2 (two) times daily with meals. (Patient not taking: Reported on 4/25/2025)      Omeprazole 40 MG Oral Capsule Delayed Release  (Patient not taking: Reported on 4/25/2025)      prazosin 2 MG Oral Cap  (Patient not taking: Reported on 4/25/2025)      prazosin 5 MG Oral Cap TAKE 1 CAPSULE BY MOUTH IN THE MORNIGN AND 1 CAPSULE BEFORE BEDTIME (Patient not taking: Reported on 4/25/2025)      pregabalin 75 MG Oral Cap  (Patient not taking: Reported on 4/25/2025)      QUEtiapine 300 MG Oral Tab Take 1 tablet (300 mg total) by mouth nightly. (Patient not taking: Reported on 4/25/2025)      QUEtiapine 100 MG Oral Tab TAKE 1.5 TABLET BY MOUTH (150MG TOTAL) EVERY NIGHT (Patient  not taking: Reported on 4/25/2025)      QUEtiapine  MG Oral Tablet 24 Hr Take 1 tablet (300 mg total) by mouth nightly. (Patient not taking: Reported on 4/25/2025)      Urea 20 % External Cream Apply topically 2 (two) times daily. (Patient not taking: Reported on 4/25/2025)      HYDROcodone-acetaminophen 5-325 MG Oral Tab Take 1 tablet by mouth every 6 (six) hours as needed. (Patient not taking: Reported on 4/25/2025) 10 tablet 0    HYDROcodone-acetaminophen 5-325 MG Oral Tab Take 1-2 tablets by mouth every 4 (four) hours as needed for Pain. (Patient not taking: Reported on 4/25/2025) 15 tablet 0       Allergies  Allergies[1]    Review of Systems:   A comprehensive 10 point review of systems was completed.  Pertinent positives and negatives noted in the the HPI.    Physical Exam:   There were no vitals taken for this visit.    GENERAL: No acute distress, Comfortable appearing  FACE: HB 1/6, Normal Animation  HEAD: Normocephalic  EYES: EOMI, pupils equil  EARS: Bilateral Auricles Symmetric, bilateral tympanic membranes appear normal  NOSE: Nares patent bilaterally, there is pale edema of the inferior turbinates bilaterally worse on the left, septum is mildly deviated to the left.  ORAL CAVITY: Tongue mobile, Oropharynx with 1+ tonsils, minimal erythema and edema of the posterior pillars bilaterally, floor of mouth clear, Posterior oropharynx normal, able to visualize the epiglottis which appears normal   NECK: No palpable lymphadenopathy, thyroid not palpable, nontender      Results:     Laboratory Data:  Lab Results   Component Value Date    WBC 11.0 03/16/2025    HGB 15.5 03/16/2025    HCT 46.0 03/16/2025    .0 03/16/2025    CREATSERUM 0.73 03/16/2025    BUN 8 (L) 03/16/2025     03/16/2025    K 3.4 (L) 03/16/2025     03/16/2025    CO2 27.0 03/16/2025    GLU 91 03/16/2025    CA 9.3 03/16/2025    ALB 4.2 11/12/2019    ALKPHO 57 11/12/2019    TP 7.3 11/12/2019    AST 17 11/12/2019    ALT 27  11/12/2019     11/12/2019    ETOH <3 11/12/2019         Imaging:  CT SOFT TISSUE OF NECK(CONTRAST ONLY) (CPT=70491)    Result Date: 3/16/2025  PROCEDURE: CT SOFT TISSUE OF NECK (CONTRAST ONLY) (CPT=70491)  COMPARISON: None.  INDICATIONS: Left-sided throat pain and abscess, difficulty swallowing.  Odynophagia  TECHNIQUE:   CT images were obtained with non-ionic intravenous contrast material.  Automated exposure control for dose reduction was used. Adjustment of the mA and/or kV was done based on the patient's size. Use of iterative reconstruction technique for  dose reduction was used. Dose information is transmitted to the ACR (American College of Radiology) NRDR (National Radiology Data Registry) which includes the Dose Index Registry.   FINDINGS:  UPPER AERODIGESTIVE TRACT: Elevation of soft palate closing the nasopharynx.  No nasopharyngeal mass.  Asymmetric mass like enlargement of the left palatine tonsil with a 2.9 x 2.5 x 1.7 cm region of hypoattenuation likely related to a tonsillar abscess.   No oral cavity mass or calculus.  Epiglottis, supraglottic larynx, larynx and subglottic airway are normal.  No suspect mass in the upper aerodigestive tract. PAROTID GLANDS:   Normal.  SUBMANDIBULAR GLANDS:   Normal.  THYROID GLAND:   Normal.  LYMPH NODES:   Approximately 1 cm short axis borderline enlarged bilateral level 2 a lymph nodes-likely reactive. SINONASAL:   No acute sinusitis.  A right maxillary sinus retention cyst.  Nasal septum deviates towards left.  No nasal passage mass. MASTOIDS: No evidence for mastoiditis. ORBITS: Limited views normal. BRAIN: No suspect mass or enhancement in visualized portion of brain. VASCULAR: Normal.  BONES: Old right-sided nasal bone fracture. APICES/MEDIASTINUM:   Normal visualized portions.  OTHER: Negative.         CONCLUSION:  1. Massive enlargement of left palatine tonsil with a 2.9 cm tonsillar abscess.  ENT evaluation recommended.    Dictated by (CST): Gorge  Duc ESQUIVEL MD on 3/16/2025 at 3:47 PM     Finalized by (CST): Duc Pennington MD on 3/16/2025 at 3:53 PM              Impression:       ICD-10-CM    1. Peritonsillar abscess  J36       2. Odynophagia  R13.10            Recommendations:  The patient has had significant improvement of his sore throat.  Has returned to normal function.  He does endorse nasal congestion especially in the left side.  I would like him to start Flonase and azelastine in combination.  He will return to see me as needed.    Thank you for allowing me to participate in the care of your patient.    Anshul Morris, DO   Otolaryngology/Rhinology, Sinus, and Endoscopic Skull Base Surgery  [address] [place] [address]  Phone [phone]  Fax [phone]  3/21/2025  9:55 AM  4/25/2025          [1] No Known Allergies

## 2025-06-21 NOTE — ED INITIAL ASSESSMENT (HPI)
Pt arrives ambulatory to ED for c/o abdominal pain, NVD and dizziness since last night. Pt states he has decreased urination as well. Aox4, speaking in full sentences.

## 2025-06-22 NOTE — ED PROVIDER NOTES
Patient Seen in: [facility] Emergency Department    History     Chief Complaint   Patient presents with    Nausea/Vomiting/Diarrhea    Abdomen/Flank Pain    Dizziness       HPI    The patient presents to the ED complaining of abdominal cramps, nausea vomiting and diarrhea, body aches and chills starting last night.  It has been ongoing.  Vomiting yesterday, none today.  Only frequent diarrhea today.    History reviewed. Past Medical History[1]    History reviewed. Past Surgical History[2]      Medications :  Prescriptions Prior to Admission[3]     Family History[4]    Smoking Status: Social Hx on file[5]    Constitutional and vital signs reviewed.      Social History and Family History elements reviewed from today, pertinent positives to the presenting problem noted.    Physical Exam     ED Triage Vitals [06/21/25 1330]   BP (!) 131/97   Pulse (!) 122   Resp 22   Temp 98.1 °F (36.7 °C)   Temp src Temporal   SpO2 97 %   O2 Device None (Room air)       All measures to prevent infection transmission during my interaction with the patient were taken. Handwashing was performed prior to and after the exam.  Stethoscope and any equipment used during my examination was cleaned with super sani-cloth germicidal wipes following the exam.     Physical Exam  Vitals and nursing note reviewed.   Constitutional:       General: He is not in acute distress.     Appearance: He is well-developed. He is not ill-appearing or toxic-appearing.   HENT:      Head: Normocephalic and atraumatic.   Eyes:      General:         Right eye: No discharge.         Left eye: No discharge.      Conjunctiva/sclera: Conjunctivae normal.   Neck:      Trachea: No tracheal deviation.   Cardiovascular:      Rate and Rhythm: Normal rate.   Pulmonary:      Effort: Pulmonary effort is normal. No respiratory distress.      Breath sounds: No stridor.   Abdominal:      General: There is no distension.      Palpations: Abdomen is soft.      Tenderness: There  is no abdominal tenderness.   Musculoskeletal:         General: No deformity.   Skin:     General: Skin is warm and dry.   Neurological:      Mental Status: He is alert and oriented to person, place, and time.   Psychiatric:         Mood and Affect: Mood is anxious.         Behavior: Behavior normal.         ED Course        Labs Reviewed   COMP METABOLIC PANEL (14) - Abnormal; Notable for the following components:       Result Value    Sodium 135 (*)     ALT 50 (*)     AST 48 (*)     Bilirubin, Total 1.7 (*)     Albumin 5.3 (*)     All other components within normal limits   CBC WITH DIFFERENTIAL WITH PLATELET - Abnormal; Notable for the following components:    Neutrophil Absolute Prelim 10.16 (*)     Neutrophil Absolute 10.16 (*)     Lymphocyte Absolute 0.32 (*)     All other components within normal limits   LIPASE - Normal   RAINBOW DRAW LAVENDER   RAINBOW DRAW LIGHT GREEN   RAINBOW DRAW BLUE   RAINBOW DRAW GOLD     EKG    Rate, intervals and axes as noted on EKG Report.  Rate: Tachycardic, 104 bpm  Rhythm: Sinus Rhythm  Reading: Sinus tachycardia, normal segments,           As Interpreted by me    Imaging Results Available and Reviewed while in ED: No results found.  ED Medications Administered:   Medications   sodium chloride 0.9 % IV bolus 1,000 mL (0 mL Intravenous Stopped 6/21/25 1559)   ondansetron (Zofran) 4 MG/2ML injection 4 mg (4 mg Intravenous Given 6/21/25 1519)   loperamide (Imodium) cap 4 mg (4 mg Oral Given 6/21/25 1517)   dicyclomine (Bentyl) tab 20 mg (20 mg Oral Given 6/21/25 1517)         MDM     Vitals:    06/21/25 1330 06/21/25 1530 06/21/25 1601   BP: (!) 131/97 118/81 118/78   Pulse: (!) 122 101 78   Resp: 22     Temp: 98.1 °F (36.7 °C)     TempSrc: Temporal     SpO2: 97% 98%    Weight: 68 kg       *I personally reviewed and interpreted all ED vitals.    Pulse Ox: 98%, Room air, Normal     Monitor Interpretation:   normal sinus rhythm, sinus tachycardia as interpreted by me.  The cardiac  monitor was ordered to monitor heart rate.    Differential Diagnosis/ Diagnostic Considerations: Viral gastroenteritis, dehydration, GERMAN, other    Complicating Factors: The patient already has does not have a problem list on file. to contribute to the complexity of this ED evaluation.    Medical Decision Making  Patient presents to the ED with likely viral gastritis symptoms.  Crampy abdominal pain that comes and goes.  Nausea vomiting diarrhea antibiotics chills.  Nondistressed on examination.  Laboratory testing without concerning findings.  Patient feeling much improved in the ED with fluids and antiemetics.  Stable for discharge with continued supportive care and outpatient follow-up.    Problems Addressed:  Muscle cramps: acute illness or injury  Nausea vomiting and diarrhea: acute illness or injury  Viral illness: acute illness or injury    Amount and/or Complexity of Data Reviewed  Labs: ordered. Decision-making details documented in ED Course.    Risk  Prescription drug management.        Condition upon leaving the department: Stable    Disposition and Plan     Clinical Impression:  1. Nausea vomiting and diarrhea    2. Viral illness    3. Muscle cramps        Disposition:  Discharge    Follow-up:  Daphney Lopez MD  [address] [place] [id number]  [phone]    Schedule an appointment as soon as possible for a visit  As needed      Medications Prescribed:  Discharge Medication List as of 6/21/2025  4:00 PM        START taking these medications    Details   ondansetron 4 MG Oral Tablet Dispersible Take 1 tablet (4 mg total) by mouth every 4 (four) hours as needed for Nausea., Normal, Disp-15 tablet, R-0      dicyclomine 20 MG Oral Tab Take 1 tablet (20 mg total) by mouth 4 (four) times daily as needed (Abdominal pain)., Normal, Disp-30 tablet, R-0      Loperamide HCl 2 MG Oral Tab Take 1 tablet (2 mg total) by mouth 4 (four) times daily as needed for Diarrhea., Normal, Disp-20 tablet, R-0                               [1]   Past Medical History:   ADHD   [2]   Past Surgical History:  Procedure Laterality Date    Appendectomy     [3] (Not in a hospital admission)  [4] No family history on file.  [5]   Social History  Socioeconomic History    Marital status: Single   Tobacco Use    Smoking status: Never    Smokeless tobacco: Never   Vaping Use    Vaping status: Never Used   Substance and Sexual Activity    Alcohol use: Yes    Drug use: Never

## (undated) NOTE — LETTER
Date & Time: 3/16/2025, 5:21 PM  Patient: Bong Membreno  Encounter Provider(s):    Teresa Arevalo MD       To Whom It May Concern:    Bong Membreno was seen and treated in our department on 3/16/2025. He  can return to physical therapy when symptoms improve .    If you have any questions or concerns, please do not hesitate to call.        _____________________________  Physician/APC Signature

## (undated) NOTE — ED AVS SNAPSHOT
Izabel Sifuentes   MRN: [id number]    Department:  [facility] Emergency Department   Date of Visit:  11/12/2019           Disclosure     Insurance plans vary and the physician(s) referred by the ER may not be covered by your plan.  Please contact your CARE PHYSICIAN AT ONCE OR RETURN IMMEDIATELY TO THE EMERGENCY DEPARTMENT. If you have been prescribed any medication(s), please fill your prescription right away and begin taking the medication(s) as directed.   If you believe that any of the medications